# Patient Record
Sex: MALE | Race: OTHER | Employment: OTHER | URBAN - METROPOLITAN AREA
[De-identification: names, ages, dates, MRNs, and addresses within clinical notes are randomized per-mention and may not be internally consistent; named-entity substitution may affect disease eponyms.]

---

## 2018-09-22 ENCOUNTER — APPOINTMENT (OUTPATIENT)
Dept: ULTRASOUND IMAGING | Age: 18
End: 2018-09-22
Attending: EMERGENCY MEDICINE
Payer: OTHER GOVERNMENT

## 2018-09-22 ENCOUNTER — HOSPITAL ENCOUNTER (EMERGENCY)
Age: 18
Discharge: HOME | End: 2018-09-22
Attending: EMERGENCY MEDICINE
Payer: OTHER GOVERNMENT

## 2018-09-22 VITALS
TEMPERATURE: 97.7 F | HEART RATE: 51 BPM | HEIGHT: 73 IN | DIASTOLIC BLOOD PRESSURE: 71 MMHG | RESPIRATION RATE: 16 BRPM | OXYGEN SATURATION: 100 % | BODY MASS INDEX: 22.53 KG/M2 | SYSTOLIC BLOOD PRESSURE: 125 MMHG | WEIGHT: 170 LBS

## 2018-09-22 DIAGNOSIS — K80.20 CALCULUS OF GALLBLADDER WITHOUT CHOLECYSTITIS WITHOUT OBSTRUCTION: Primary | ICD-10-CM

## 2018-09-22 LAB
ALBUMIN SERPL-MCNC: 4.4 G/DL (ref 3.4–5)
ALBUMIN/GLOB SERPL: 1.1 {RATIO} (ref 0.8–1.7)
ALP SERPL-CCNC: 93 U/L (ref 45–117)
ALT SERPL-CCNC: 38 U/L (ref 16–61)
ANION GAP SERPL CALC-SCNC: 7 MMOL/L (ref 3–18)
AST SERPL-CCNC: 19 U/L (ref 15–37)
BASOPHILS # BLD: 0 K/UL (ref 0–0.1)
BASOPHILS NFR BLD: 0 % (ref 0–2)
BILIRUB SERPL-MCNC: 0.4 MG/DL (ref 0.2–1)
BUN SERPL-MCNC: 15 MG/DL (ref 7–18)
BUN/CREAT SERPL: 17 (ref 12–20)
CALCIUM SERPL-MCNC: 9.3 MG/DL (ref 8.5–10.1)
CHLORIDE SERPL-SCNC: 107 MMOL/L (ref 100–108)
CO2 SERPL-SCNC: 26 MMOL/L (ref 21–32)
CREAT SERPL-MCNC: 0.87 MG/DL (ref 0.6–1.3)
DIFFERENTIAL METHOD BLD: ABNORMAL
EOSINOPHIL # BLD: 0.1 K/UL (ref 0–0.4)
EOSINOPHIL NFR BLD: 1 % (ref 0–5)
ERYTHROCYTE [DISTWIDTH] IN BLOOD BY AUTOMATED COUNT: 13.7 % (ref 11.6–14.5)
GLOBULIN SER CALC-MCNC: 3.9 G/DL (ref 2–4)
GLUCOSE SERPL-MCNC: 114 MG/DL (ref 74–99)
HCT VFR BLD AUTO: 41.7 % (ref 36–48)
HGB BLD-MCNC: 14 G/DL (ref 13–16)
LIPASE SERPL-CCNC: 120 U/L (ref 73–393)
LYMPHOCYTES # BLD: 2.4 K/UL (ref 0.9–3.6)
LYMPHOCYTES NFR BLD: 25 % (ref 21–52)
MAGNESIUM SERPL-MCNC: 2.1 MG/DL (ref 1.6–2.6)
MCH RBC QN AUTO: 28.1 PG (ref 24–34)
MCHC RBC AUTO-ENTMCNC: 33.6 G/DL (ref 31–37)
MCV RBC AUTO: 83.7 FL (ref 74–97)
MONOCYTES # BLD: 0.9 K/UL (ref 0.05–1.2)
MONOCYTES NFR BLD: 9 % (ref 3–10)
NEUTS SEG # BLD: 6.3 K/UL (ref 1.8–8)
NEUTS SEG NFR BLD: 65 % (ref 40–73)
PLATELET # BLD AUTO: 264 K/UL (ref 135–420)
PMV BLD AUTO: 9 FL (ref 9.2–11.8)
POTASSIUM SERPL-SCNC: 4 MMOL/L (ref 3.5–5.5)
PROT SERPL-MCNC: 8.3 G/DL (ref 6.4–8.2)
RBC # BLD AUTO: 4.98 M/UL (ref 4.7–5.5)
SODIUM SERPL-SCNC: 140 MMOL/L (ref 136–145)
WBC # BLD AUTO: 9.6 K/UL (ref 4.6–13.2)

## 2018-09-22 PROCEDURE — 83735 ASSAY OF MAGNESIUM: CPT | Performed by: EMERGENCY MEDICINE

## 2018-09-22 PROCEDURE — 85025 COMPLETE CBC W/AUTO DIFF WBC: CPT | Performed by: EMERGENCY MEDICINE

## 2018-09-22 PROCEDURE — 74011250637 HC RX REV CODE- 250/637: Performed by: EMERGENCY MEDICINE

## 2018-09-22 PROCEDURE — 74011000250 HC RX REV CODE- 250: Performed by: EMERGENCY MEDICINE

## 2018-09-22 PROCEDURE — 99284 EMERGENCY DEPT VISIT MOD MDM: CPT

## 2018-09-22 PROCEDURE — 80053 COMPREHEN METABOLIC PANEL: CPT | Performed by: EMERGENCY MEDICINE

## 2018-09-22 PROCEDURE — 83690 ASSAY OF LIPASE: CPT | Performed by: EMERGENCY MEDICINE

## 2018-09-22 PROCEDURE — 76705 ECHO EXAM OF ABDOMEN: CPT

## 2018-09-22 PROCEDURE — 96375 TX/PRO/DX INJ NEW DRUG ADDON: CPT

## 2018-09-22 PROCEDURE — 74011250636 HC RX REV CODE- 250/636: Performed by: EMERGENCY MEDICINE

## 2018-09-22 PROCEDURE — 96374 THER/PROPH/DIAG INJ IV PUSH: CPT

## 2018-09-22 PROCEDURE — 96361 HYDRATE IV INFUSION ADD-ON: CPT

## 2018-09-22 RX ORDER — MAG HYDROX/ALUMINUM HYD/SIMETH 200-200-20
30 SUSPENSION, ORAL (FINAL DOSE FORM) ORAL
Status: DISCONTINUED | OUTPATIENT
Start: 2018-09-22 | End: 2018-09-22

## 2018-09-22 RX ORDER — LIDOCAINE HYDROCHLORIDE 20 MG/ML
15 SOLUTION OROPHARYNGEAL
Status: COMPLETED | OUTPATIENT
Start: 2018-09-22 | End: 2018-09-22

## 2018-09-22 RX ORDER — KETOROLAC TROMETHAMINE 30 MG/ML
15 INJECTION, SOLUTION INTRAMUSCULAR; INTRAVENOUS
Status: COMPLETED | OUTPATIENT
Start: 2018-09-22 | End: 2018-09-22

## 2018-09-22 RX ORDER — FAMOTIDINE 10 MG/ML
20 INJECTION INTRAVENOUS
Status: COMPLETED | OUTPATIENT
Start: 2018-09-22 | End: 2018-09-22

## 2018-09-22 RX ORDER — MAG HYDROX/ALUMINUM HYD/SIMETH 200-200-20
30 SUSPENSION, ORAL (FINAL DOSE FORM) ORAL
Status: COMPLETED | OUTPATIENT
Start: 2018-09-22 | End: 2018-09-22

## 2018-09-22 RX ADMIN — SODIUM CHLORIDE 1000 ML: 900 INJECTION, SOLUTION INTRAVENOUS at 04:07

## 2018-09-22 RX ADMIN — ALUMINUM HYDROXIDE, MAGNESIUM HYDROXIDE, AND SIMETHICONE 30 ML: 200; 200; 20 SUSPENSION ORAL at 05:12

## 2018-09-22 RX ADMIN — LIDOCAINE HYDROCHLORIDE 15 ML: 20 SOLUTION ORAL; TOPICAL at 05:12

## 2018-09-22 RX ADMIN — FAMOTIDINE 20 MG: 10 INJECTION, SOLUTION INTRAVENOUS at 04:17

## 2018-09-22 RX ADMIN — KETOROLAC TROMETHAMINE 15 MG: 30 INJECTION, SOLUTION INTRAMUSCULAR at 04:10

## 2018-09-22 NOTE — DISCHARGE INSTRUCTIONS
Biliary Colic: Care Instructions  Your Care Instructions    Biliary (say \"BILL-ee-air-ee\") colic is belly pain caused by gallbladder problems. It is usually caused by a gallstone moving through or blocking the common bile duct or cystic duct. Gallstones are stones that form in the gallbladder. They are made of cholesterol and other substances. The gallbladder is a small sac located just under the liver. It stores bile released by the liver. Bile helps you digest fats. Gallstones also can form in the common bile duct or cystic duct. These ducts carry bile from the gallbladder and the liver to the small intestine. Gallstones may be as small as a grain of sand or as large as a golf ball. Gallstones that cause severe symptoms usually are treated with surgery to remove the gallbladder. If the first attack of biliary colic is mild, it is often safe to wait until you have had another attack before you think about having surgery. The doctor has checked you carefully, but problems can develop later. If you notice any problems or new symptoms, get medical treatment right away. Follow-up care is a key part of your treatment and safety. Be sure to make and go to all appointments, and call your doctor if you are having problems. It's also a good idea to know your test results and keep a list of the medicines you take. How can you care for yourself at home? · Take pain medicines exactly as directed. ¨ If the doctor gave you a prescription medicine for pain, take it as prescribed. ¨ If you are not taking a prescription pain medicine, ask your doctor if you can take an over-the-counter medicine. Read and follow all instructions on the label. · Avoid foods that cause symptoms, especially fatty foods. These can cause biliary colic. · You may need more tests to look at your gallbladder. When should you call for help?   Call your doctor now or seek immediate medical care if:    · You have a fever.     · You have new belly pain, or your pain gets worse.     · There is a new or increasing yellow tint to your skin or the whites of your eyes.     · Your urine is dark yellow-brown, or your stools are light-colored or white.     · You cannot keep down fluids.    Watch closely for changes in your health, and be sure to contact your doctor if:    · You do not get better as expected.     · You are not getting better after 1 day (24 hours). Where can you learn more? Go to http://paula-cristion.info/. Enter F940 in the search box to learn more about \"Biliary Colic: Care Instructions. \"  Current as of: May 12, 2017  Content Version: 11.7  © 5406-0669 [employer]. Care instructions adapted under license by [employer] (which disclaims liability or warranty for this information). If you have questions about a medical condition or this instruction, always ask your healthcare professional. [address] any warranty or liability for your use of this information.

## 2018-09-22 NOTE — ED PROVIDER NOTES
EMERGENCY DEPARTMENT HISTORY AND PHYSICAL EXAM 
 
3:54 AM 
 
 
Date: 9/22/2018 Patient Name: Kelsey Molina History of Presenting Illness Chief Complaint Patient presents with  Abdominal Pain History Provided By: Patient Chief Complaint: abdominal pain Duration: 5 hours Timing:  Acute Location: mid-abdomen Quality: non-radiating Severity: 8/10 Modifying Factors: pain exacerbated by siting up straight and laying down Associated Symptoms: denies any nausea, vomiting, fevers, chills, sore throat,  complications. Additional History (Context): Kelsey Molina is a 19 y.o. male presents to the ED complaining of acute, mid-abdominal pain that started 5 hours ago. Patient describes his pain as non-radiating and rates it an 8/10 in severity. His pain is exacerbated by sitting up straight and laying down. Patient notes eating [employer] pizza earlier this evening but otherwise no new changes to his routine prior to onset of abdominal pain. Denies any nausea, vomiting, fevers, chills, sore throat,  complications. No pertinent past medical or surgical history. No ETOH, tobacco usage, or illicit drug use. No other complaints or concerns at this time. PCP: None Past History Past Medical History: No past medical history on file. Past Surgical History: No past surgical history on file. Family History: No family history on file. Social History: 
Social History Substance Use Topics  Smoking status: Never Smoker  Smokeless tobacco: Never Used  Alcohol use Not on file Allergies: 
No Known Allergies Review of Systems Review of Systems Constitutional: Negative for chills and fever. HENT: Negative for sore throat. Gastrointestinal: Positive for abdominal pain. Negative for nausea and vomiting. Genitourinary: Negative for dysuria and hematuria. All other systems reviewed and are negative. Visit Vitals  /61 (BP 1 Location: Right arm)  Pulse (!) 51  Temp 97.7 °F (36.5 °C)  Resp 16  
 Ht 6' 1\" (1.854 m)  Wt 77.1 kg (170 lb)  SpO2 100%  BMI 22.43 kg/m2  
 
 
100% on RA. Interpretation: non-hypoxic Physical Exam / Medical Decision Making I am the first provider for this patient. I reviewed the vital signs, available nursing notes, past medical history, past surgical history, family history and social history. Vital Signs-Reviewed the patient's vital signs. Physical exam: 
General:  Well-developed, well-nourished, uncomfortable appearing nontoxic nondiaphoretic Head:  Normocephalic atraumatic. Eyes:  Pupils midrange extraocular movements intact. No pallor or conjunctival injection. Nose:  No rhinorrhea, inspection grossly normal.   
Ears:  Grossly normal to inspection, no discharge. Mouth:  Mucous membranes moist, no appreciable intraoral lesion. Neck/Back:  Trachea midline, no asymmetry. Chest:  Grossly normal inspection, symmetric chest rise. Pulmonary:  Clear to auscultation bilaterally no wheezes rhonchi or rales. Cardiovascular:  S1-S2 no murmurs rubs or gallops. Abdomen: Soft, tender to palpation predominantly in the RIGHT upper quadrant also epigastrium no CVA tenderness, nondistended no guarding rebound or peritoneal signs. : Declined Extremities:  Grossly normal to inspection, peripheral pulses intact Neurologic:  Alert and oriented no appreciable focal neurologic deficit Skin:  Warm and dry Psychiatric:  Grossly normal mood and affect Nursing note reviewed, vital signs reviewed. ED course: 
Patient presents with abdominal pain epigastric by his report mostly tender in the RIGHT upper quadrant last meal was pizza. Primary concern for gastritis hepatobiliary pathology no  symptoms by his report. No nausea no vomiting we'll treat empirically for gastric source, labs and consider ultrasound if persistently symptomatic. Labs unremarkable Patient reevaluated, symptoms have improved, no epigastric tenderness but on reevaluation he still has RIGHT upper quadrant tenderness with Estel Crea sign It is now the end of my shift, I am still awaiting ultrasound reevaluation. Patient will be signed out to the oncoming physician Dr. Markus Do at 0600. Disposition: 
 
Pending Portions of this chart were created with Dragon medical speech to text program.   Unrecognized errors may be present. Diagnostic Study Results Labs - Recent Results (from the past 12 hour(s)) CBC WITH AUTOMATED DIFF Collection Time: 09/22/18  3:35 AM  
Result Value Ref Range WBC 9.6 4.6 - 13.2 K/uL  
 RBC 4.98 4.70 - 5.50 M/uL  
 HGB 14.0 13.0 - 16.0 g/dL HCT 41.7 36.0 - 48.0 % MCV 83.7 74.0 - 97.0 FL  
 MCH 28.1 24.0 - 34.0 PG  
 MCHC 33.6 31.0 - 37.0 g/dL  
 RDW 13.7 11.6 - 14.5 % PLATELET 224 233 - 217 K/uL MPV 9.0 (L) 9.2 - 11.8 FL  
 NEUTROPHILS 65 40 - 73 % LYMPHOCYTES 25 21 - 52 % MONOCYTES 9 3 - 10 % EOSINOPHILS 1 0 - 5 % BASOPHILS 0 0 - 2 %  
 ABS. NEUTROPHILS 6.3 1.8 - 8.0 K/UL  
 ABS. LYMPHOCYTES 2.4 0.9 - 3.6 K/UL  
 ABS. MONOCYTES 0.9 0.05 - 1.2 K/UL  
 ABS. EOSINOPHILS 0.1 0.0 - 0.4 K/UL  
 ABS. BASOPHILS 0.0 0.0 - 0.1 K/UL  
 DF AUTOMATED METABOLIC PANEL, COMPREHENSIVE Collection Time: 09/22/18  3:35 AM  
Result Value Ref Range Sodium 140 136 - 145 mmol/L Potassium 4.0 3.5 - 5.5 mmol/L Chloride 107 100 - 108 mmol/L  
 CO2 26 21 - 32 mmol/L Anion gap 7 3.0 - 18 mmol/L Glucose 114 (H) 74 - 99 mg/dL BUN 15 7.0 - 18 MG/DL Creatinine 0.87 0.6 - 1.3 MG/DL  
 BUN/Creatinine ratio 17 12 - 20 GFR est AA >60 >60 ml/min/1.73m2 GFR est non-AA >60 >60 ml/min/1.73m2 Calcium 9.3 8.5 - 10.1 MG/DL Bilirubin, total 0.4 0.2 - 1.0 MG/DL  
 ALT (SGPT) 38 16 - 61 U/L  
 AST (SGOT) 19 15 - 37 U/L Alk. phosphatase 93 45 - 117 U/L Protein, total 8.3 (H) 6.4 - 8.2 g/dL Albumin 4.4 3.4 - 5.0 g/dL Globulin 3.9 2.0 - 4.0 g/dL A-G Ratio 1.1 0.8 - 1.7 LIPASE Collection Time: 09/22/18  3:35 AM  
Result Value Ref Range Lipase 120 73 - 393 U/L MAGNESIUM Collection Time: 09/22/18  3:35 AM  
Result Value Ref Range Magnesium 2.1 1.6 - 2.6 mg/dL Radiologic Studies -  
 ABD [facility]    (Results Pending) Diagnosis Clinical Impression: No diagnosis found. Follow-up Information None Patient's Medications No medications on file  
 
_______________________________ Attestations: 
Scribe Attestation Patti Chavez acting as a scribe for and in the presence of Shanika Arthur MD     
September 22, 2018 at 5:59 AM 
    
Provider Attestation:     
I personally performed the services described in the documentation, reviewed the documentation, as recorded by the scribe in my presence, and it accurately and completely records my words and actions. September 22, 2018 at 5:59 AM - Shanika Arthur MD 
_______________________________

## 2018-09-22 NOTE — ED NOTES
Report received from Ramya. Friend at [facility]. No questions. Awaiting pt return from other department.

## 2018-09-22 NOTE — ED NOTES
6:28 AM :Pt care assumed from Dr. Juliane Nissen , ED provider. Pt complaint(s), current treatment plan, progression and available diagnostic results have been discussed thoroughly. Rounding occurred: yes Intended Disposition: TBD Pending diagnostic reports and/or labs (please list): US 
 
9:07 AM 
Pain free but has mild tenderness to ruq, no vomiting and no pain when abdomen is not being mnipulated. Discussed to return to ER for uncontrolled pain or vomiting. Will contact medical unit today for surgical referral.  
Also will give him follow up with our surgeon. I do not think he has acute cholecystitis at this time. Encounter Diagnoses ICD-10-CM ICD-9-CM 1. Calculus of gallbladder without cholecystitis without obstruction K80.20 574.20 Scribe Attestation Nicole Victoria acting as a scribe for and in the presence of Ester Marsh MD     
September 22, 2018 at [facility] 
    
Provider Attestation:     
I personally performed the services described in the documentation, reviewed the documentation, as recorded by the scribe in my presence, and it accurately and completely records my words and actions.  September 22, 2018 at 6:28 AM -Ester Marsh MD